# Patient Record
Sex: FEMALE | Race: WHITE | NOT HISPANIC OR LATINO
[De-identification: names, ages, dates, MRNs, and addresses within clinical notes are randomized per-mention and may not be internally consistent; named-entity substitution may affect disease eponyms.]

---

## 2021-04-21 ENCOUNTER — NON-APPOINTMENT (OUTPATIENT)
Age: 31
End: 2021-04-21

## 2021-06-03 ENCOUNTER — APPOINTMENT (OUTPATIENT)
Dept: HUMAN REPRODUCTION | Facility: CLINIC | Age: 31
End: 2021-06-03
Payer: COMMERCIAL

## 2021-06-03 ENCOUNTER — NON-APPOINTMENT (OUTPATIENT)
Age: 31
End: 2021-06-03

## 2021-06-03 PROBLEM — Z00.00 ENCOUNTER FOR PREVENTIVE HEALTH EXAMINATION: Status: ACTIVE | Noted: 2021-06-03

## 2021-06-03 PROCEDURE — 99072 ADDL SUPL MATRL&STAF TM PHE: CPT

## 2021-06-03 PROCEDURE — 99205 OFFICE O/P NEW HI 60 MIN: CPT | Mod: 25

## 2021-06-03 PROCEDURE — 76830 TRANSVAGINAL US NON-OB: CPT

## 2021-06-03 PROCEDURE — 36415 COLL VENOUS BLD VENIPUNCTURE: CPT

## 2025-06-17 ENCOUNTER — EMERGENCY (EMERGENCY)
Facility: HOSPITAL | Age: 35
LOS: 1 days | End: 2025-06-17
Attending: STUDENT IN AN ORGANIZED HEALTH CARE EDUCATION/TRAINING PROGRAM
Payer: COMMERCIAL

## 2025-06-17 VITALS
TEMPERATURE: 98 F | OXYGEN SATURATION: 97 % | HEART RATE: 68 BPM | RESPIRATION RATE: 16 BRPM | SYSTOLIC BLOOD PRESSURE: 134 MMHG | DIASTOLIC BLOOD PRESSURE: 80 MMHG

## 2025-06-17 VITALS
WEIGHT: 156.97 LBS | OXYGEN SATURATION: 100 % | TEMPERATURE: 98 F | SYSTOLIC BLOOD PRESSURE: 137 MMHG | HEART RATE: 96 BPM | RESPIRATION RATE: 18 BRPM | DIASTOLIC BLOOD PRESSURE: 88 MMHG

## 2025-06-17 LAB
ALBUMIN SERPL ELPH-MCNC: 4 G/DL — SIGNIFICANT CHANGE UP (ref 3.3–5)
ALP SERPL-CCNC: 42 U/L — SIGNIFICANT CHANGE UP (ref 40–120)
ALT FLD-CCNC: 14 U/L — SIGNIFICANT CHANGE UP (ref 10–45)
ANION GAP SERPL CALC-SCNC: 13 MMOL/L — SIGNIFICANT CHANGE UP (ref 5–17)
APPEARANCE UR: CLEAR — SIGNIFICANT CHANGE UP
APTT BLD: 25.9 SEC — LOW (ref 26.1–36.8)
AST SERPL-CCNC: 13 U/L — SIGNIFICANT CHANGE UP (ref 10–40)
BACTERIA # UR AUTO: ABNORMAL /HPF
BASOPHILS # BLD AUTO: 0.04 K/UL — SIGNIFICANT CHANGE UP (ref 0–0.2)
BASOPHILS NFR BLD AUTO: 0.4 % — SIGNIFICANT CHANGE UP (ref 0–2)
BILIRUB SERPL-MCNC: 0.4 MG/DL — SIGNIFICANT CHANGE UP (ref 0.2–1.2)
BILIRUB UR-MCNC: NEGATIVE — SIGNIFICANT CHANGE UP
BUN SERPL-MCNC: 12 MG/DL — SIGNIFICANT CHANGE UP (ref 7–23)
CALCIUM SERPL-MCNC: 9.4 MG/DL — SIGNIFICANT CHANGE UP (ref 8.4–10.5)
CAST: 0 /LPF — SIGNIFICANT CHANGE UP (ref 0–4)
CHLORIDE SERPL-SCNC: 105 MMOL/L — SIGNIFICANT CHANGE UP (ref 96–108)
CO2 SERPL-SCNC: 21 MMOL/L — LOW (ref 22–31)
COLOR SPEC: YELLOW — SIGNIFICANT CHANGE UP
CREAT SERPL-MCNC: 0.74 MG/DL — SIGNIFICANT CHANGE UP (ref 0.5–1.3)
DIFF PNL FLD: NEGATIVE — SIGNIFICANT CHANGE UP
EGFR: 108 ML/MIN/1.73M2 — SIGNIFICANT CHANGE UP
EGFR: 108 ML/MIN/1.73M2 — SIGNIFICANT CHANGE UP
EOSINOPHIL # BLD AUTO: 0.13 K/UL — SIGNIFICANT CHANGE UP (ref 0–0.5)
EOSINOPHIL NFR BLD AUTO: 1.2 % — SIGNIFICANT CHANGE UP (ref 0–6)
GLUCOSE SERPL-MCNC: 92 MG/DL — SIGNIFICANT CHANGE UP (ref 70–99)
GLUCOSE UR QL: NEGATIVE MG/DL — SIGNIFICANT CHANGE UP
HCG SERPL-ACNC: 3.3 MIU/ML — SIGNIFICANT CHANGE UP
HCT VFR BLD CALC: 39.1 % — SIGNIFICANT CHANGE UP (ref 34.5–45)
HGB BLD-MCNC: 12.7 G/DL — SIGNIFICANT CHANGE UP (ref 11.5–15.5)
IMM GRANULOCYTES # BLD AUTO: 0.03 K/UL — SIGNIFICANT CHANGE UP (ref 0–0.07)
IMM GRANULOCYTES NFR BLD AUTO: 0.3 % — SIGNIFICANT CHANGE UP (ref 0–0.9)
INR BLD: 0.95 RATIO — SIGNIFICANT CHANGE UP (ref 0.85–1.16)
KETONES UR QL: NEGATIVE MG/DL — SIGNIFICANT CHANGE UP
LEUKOCYTE ESTERASE UR-ACNC: ABNORMAL
LIDOCAIN IGE QN: 29 U/L — SIGNIFICANT CHANGE UP (ref 7–60)
LYMPHOCYTES # BLD AUTO: 3.1 K/UL — SIGNIFICANT CHANGE UP (ref 1–3.3)
LYMPHOCYTES NFR BLD AUTO: 27.6 % — SIGNIFICANT CHANGE UP (ref 13–44)
MAGNESIUM SERPL-MCNC: 1.9 MG/DL — SIGNIFICANT CHANGE UP (ref 1.6–2.6)
MCHC RBC-ENTMCNC: 29.1 PG — SIGNIFICANT CHANGE UP (ref 27–34)
MCHC RBC-ENTMCNC: 32.5 G/DL — SIGNIFICANT CHANGE UP (ref 32–36)
MCV RBC AUTO: 89.7 FL — SIGNIFICANT CHANGE UP (ref 80–100)
MONOCYTES # BLD AUTO: 0.58 K/UL — SIGNIFICANT CHANGE UP (ref 0–0.9)
MONOCYTES NFR BLD AUTO: 5.2 % — SIGNIFICANT CHANGE UP (ref 2–14)
NEUTROPHILS # BLD AUTO: 7.34 K/UL — SIGNIFICANT CHANGE UP (ref 1.8–7.4)
NEUTROPHILS NFR BLD AUTO: 65.3 % — SIGNIFICANT CHANGE UP (ref 43–77)
NITRITE UR-MCNC: NEGATIVE — SIGNIFICANT CHANGE UP
NRBC # BLD AUTO: 0 K/UL — SIGNIFICANT CHANGE UP (ref 0–0)
NRBC # FLD: 0 K/UL — SIGNIFICANT CHANGE UP (ref 0–0)
NRBC BLD AUTO-RTO: 0 /100 WBCS — SIGNIFICANT CHANGE UP (ref 0–0)
PH UR: 8 — SIGNIFICANT CHANGE UP (ref 5–8)
PLATELET # BLD AUTO: 297 K/UL — SIGNIFICANT CHANGE UP (ref 150–400)
PMV BLD: 10.8 FL — SIGNIFICANT CHANGE UP (ref 7–13)
POTASSIUM SERPL-MCNC: 3.8 MMOL/L — SIGNIFICANT CHANGE UP (ref 3.5–5.3)
POTASSIUM SERPL-SCNC: 3.8 MMOL/L — SIGNIFICANT CHANGE UP (ref 3.5–5.3)
PROT SERPL-MCNC: 7.1 G/DL — SIGNIFICANT CHANGE UP (ref 6–8.3)
PROT UR-MCNC: NEGATIVE MG/DL — SIGNIFICANT CHANGE UP
PROTHROM AB SERPL-ACNC: 10.9 SEC — SIGNIFICANT CHANGE UP (ref 9.9–13.4)
RBC # BLD: 4.36 M/UL — SIGNIFICANT CHANGE UP (ref 3.8–5.2)
RBC # FLD: 14.1 % — SIGNIFICANT CHANGE UP (ref 10.3–14.5)
RBC CASTS # UR COMP ASSIST: 1 /HPF — SIGNIFICANT CHANGE UP (ref 0–4)
SODIUM SERPL-SCNC: 139 MMOL/L — SIGNIFICANT CHANGE UP (ref 135–145)
SP GR SPEC: 1.01 — SIGNIFICANT CHANGE UP (ref 1–1.03)
SQUAMOUS # UR AUTO: 4 /HPF — SIGNIFICANT CHANGE UP (ref 0–5)
UROBILINOGEN FLD QL: 0.2 MG/DL — SIGNIFICANT CHANGE UP (ref 0.2–1)
WBC # BLD: 11.22 K/UL — HIGH (ref 3.8–10.5)
WBC # FLD AUTO: 11.22 K/UL — HIGH (ref 3.8–10.5)
WBC UR QL: 1 /HPF — SIGNIFICANT CHANGE UP (ref 0–5)

## 2025-06-17 PROCEDURE — 85730 THROMBOPLASTIN TIME PARTIAL: CPT

## 2025-06-17 PROCEDURE — 81001 URINALYSIS AUTO W/SCOPE: CPT

## 2025-06-17 PROCEDURE — 83690 ASSAY OF LIPASE: CPT

## 2025-06-17 PROCEDURE — 93975 VASCULAR STUDY: CPT

## 2025-06-17 PROCEDURE — 76830 TRANSVAGINAL US NON-OB: CPT | Mod: 26

## 2025-06-17 PROCEDURE — 99285 EMERGENCY DEPT VISIT HI MDM: CPT

## 2025-06-17 PROCEDURE — 96376 TX/PRO/DX INJ SAME DRUG ADON: CPT

## 2025-06-17 PROCEDURE — 84702 CHORIONIC GONADOTROPIN TEST: CPT

## 2025-06-17 PROCEDURE — 85610 PROTHROMBIN TIME: CPT

## 2025-06-17 PROCEDURE — 99285 EMERGENCY DEPT VISIT HI MDM: CPT | Mod: 25

## 2025-06-17 PROCEDURE — 83735 ASSAY OF MAGNESIUM: CPT

## 2025-06-17 PROCEDURE — 96374 THER/PROPH/DIAG INJ IV PUSH: CPT | Mod: XU

## 2025-06-17 PROCEDURE — 93975 VASCULAR STUDY: CPT | Mod: 26

## 2025-06-17 PROCEDURE — 80053 COMPREHEN METABOLIC PANEL: CPT

## 2025-06-17 PROCEDURE — 74177 CT ABD & PELVIS W/CONTRAST: CPT | Mod: 26

## 2025-06-17 PROCEDURE — 76830 TRANSVAGINAL US NON-OB: CPT

## 2025-06-17 PROCEDURE — 85025 COMPLETE CBC W/AUTO DIFF WBC: CPT

## 2025-06-17 PROCEDURE — 74177 CT ABD & PELVIS W/CONTRAST: CPT

## 2025-06-17 RX ADMIN — Medication 4 MILLIGRAM(S): at 15:55

## 2025-06-17 RX ADMIN — Medication 4 MILLIGRAM(S): at 12:42

## 2025-06-17 NOTE — ED ADULT NURSE NOTE - OBJECTIVE STATEMENT
34 y.o. female coming in for abdominal and pelvic pain. pt states that she had an egg retrieval on 6/14, states that she did not have complications with the procedure and had noted mild cramping and spotting after which she was told would be normal for her. today noted significant pain, nausea, and vaginal bleeding, called her OBGYN and was told to come into the ER for farther evaluation. pt denies PMH. A&Ox3, vss, pt denies CP/SOB/weakness/dizziness/fevers/chills/V/D/urinary symptoms, bed in lowest position, call bell in reach and teaching on use completed, verbalized understanding of call bell use.

## 2025-06-17 NOTE — ED PROVIDER NOTE - OBJECTIVE STATEMENT
34-year-old female with no significant past medical history status post egg retrieval on 6/14 presents to ED for pelvic pain.  Patient reports that procedure was reported to be unremarkable she was having some mild cramping and spotting which she was advised to be expected.  This morning had change in symptoms, reports severe shooting pelvic pain which has been persistent associated with mild nausea.  She spoke to her OB/GYN office and was advised to come to the ED.  She has not taken any for pain prior to arrival.  She denies fevers, heavy vaginal bleeding, dysuria chest pain, shortness of breath, vomiting.  OB: Nancie  Fertility: [facility]

## 2025-06-17 NOTE — CONSULT NOTE ADULT - SUBJECTIVE AND OBJECTIVE BOX
JOSE ALBERTO GREENBERG  34y  Female [id number]    HPI:  35yo G0 (LMP: 25) POD#3 from egg retrieval @ [facility] () p/w abdominal pain. Patient has had constant lower middle abdominal pain especial when walking since egg retrieval, pain worsened last night prompting patient to come to the ED. Reports 10/10 pain when first coming to ED now 7/10 after morphine. Denies vaginal bleeding, lightheadedness, dizziness, fevers, chills, SOB, CP, changes in urinary or bowel habits.       Name of GYN Physician: Dr. De Leon     POB:  Denies  Pgyn: Denies fibroids, cysts, STI's, Abnormal pap smears   PMH: Asthma   PSH: Egg retrieval (), LSC cholecystectomy, gastric sleeve   Home meds: None   All: NKDA       Vital Signs Last 24 Hrs  T(C): 36.9 (2025 15:50), Max: 36.9 (2025 15:50)  T(F): 98.5 (2025 15:50), Max: 98.5 (2025 15:50)  HR: 53 (2025 15:50) (53 - 98)  BP: 130/62 (2025 15:50) (116/71 - 137/88)  BP(mean): --  RR: 16 (2025 15:50) (15 - 18)  SpO2: 100% (2025 15:50) (97% - 100%)    Parameters below as of 2025 15:50  Patient On (Oxygen Delivery Method): room air        Physical Exam:   General: sitting comfortably in bed, NAD   Lungs: Breathing comfortably on room air   Abd: Soft, non-distended, tender on palpation over suprapubic area, no unilateral tenderness. No rebound or guarding.  :  No bleeding on pad. External labia wnl.  Bimanual exam with no cervical motion tenderness, tenderness over suprapubic area, adnexa non palpable b/l.    Ext: non-tender b/l, no edema     LABS:                            12.7   11.22 )-----------( 297      ( 2025 12:33 )             39.1     06-17    139  |  105  |  12  ----------------------------<  92  3.8   |  21[L]  |  0.74    Ca    9.4      2025 12:33  Mg     1.9         TPro  7.1  /  Alb  4.0  /  TBili  0.4  /  DBili  x   /  AST  13  /  ALT  14  /  AlkPhos  42      I&O's Detail    PT/INR - ( 2025 12:33 )   PT: 10.9 sec;   INR: 0.95 ratio         PTT - ( 2025 12:33 )  PTT:25.9 sec  Urinalysis Basic - ( 2025 14:43 )    Color: Yellow / Appearance: Clear / S.012 / pH: x  Gluc: x / Ketone: x  / Bili: Negative / Urobili: 0.2 mg/dL   Blood: x / Protein: Negative mg/dL / Nitrite: Negative   Leuk Esterase: Trace / RBC: 1 /HPF / WBC 1 /HPF   Sq Epi: x / Non Sq Epi: 4 /HPF / Bacteria: Few /HPF        RADIOLOGY & ADDITIONAL STUDIES:  TVUS:     ACC: [id number] EXAM:  US DPLX PELVIC   ORDERED BY: CARLOTA ACOSTA     ACC: [id number] EXAM:  US TRANSVAGINAL   ORDERED BY: CARLOTA ACOSTA     PROCEDURE DATE:  2025          INTERPRETATION:  CLINICAL INFORMATION: Pelvic pain status post egg   retrieval.    LMP: 2025.    COMPARISON: None available.    TECHNIQUE:  Endovaginal and transabdominal pelvic sonogram. Color and Spectral   Doppler was performed.    FINDINGS:  Uterus: 6.9 cm x 3.1 cm x 4.4 cm. Multiple small fibroids, the largest of   which measures 2.5 x 2.7 x 2.7 cm.  Endometrium: 6 mm. Within normal limits.    Right ovary: 4.8 cm x 3.8 cm x 5.0 cm. Multiple cysts. Normal arterial   and venous waveforms.  Left ovary: 8.0 cm x 5.1 cm x 6.3 cm. Multiple cysts. 2.3 x2.1 x 2.2 cm   hemorrhagic cyst. Normal arterial and venous waveforms.    Fluid: Small amount of free fluid within the cul-de-sac and bilateral   adnexa.    IMPRESSION:    Enlarged ovaries with multiple cysts as can be seen with IVF.    Small amount of pelvic fluid.        --- End of Report ---            KEYANA PAIGE MD; Attending Radiologist  This document has been electronically signed. 2025  3:35PM   JOSE ALBERTO GREENBERG  34y  Female [id number]    HPI:  35yo G0 (LMP: 25) POD#3 from egg retrieval @ [facility] () p/w abdominal pain. Patient has had constant lower middle abdominal pain especial when walking since egg retrieval, pain worsened last night prompting patient to come to the ED. Reports 10/10 pain when first coming to ED now 7/10 after morphine. Denies vaginal bleeding, lightheadedness, dizziness, fevers, chills, SOB, CP, changes in urinary or bowel habits.       Name of GYN Physician: Dr. Canada    POB:  Denies  Pgyn: Denies fibroids, cysts, STI's, Abnormal pap smears   PMH: Asthma   PSH: Egg retrieval (), LSC cholecystectomy, gastric sleeve   Home meds: None   All: NKDA       Vital Signs Last 24 Hrs  T(C): 36.9 (2025 15:50), Max: 36.9 (2025 15:50)  T(F): 98.5 (2025 15:50), Max: 98.5 (2025 15:50)  HR: 53 (2025 15:50) (53 - 98)  BP: 130/62 (2025 15:50) (116/71 - 137/88)  BP(mean): --  RR: 16 (2025 15:50) (15 - 18)  SpO2: 100% (2025 15:50) (97% - 100%)    Parameters below as of 2025 15:50  Patient On (Oxygen Delivery Method): room air        Physical Exam:   General: sitting comfortably in bed, NAD   Lungs: Breathing comfortably on room air   Abd: Soft, non-distended, tender on palpation over suprapubic area, no unilateral tenderness. No rebound or guarding.  :  No bleeding on pad. External labia wnl.  Bimanual exam with no cervical motion tenderness, tenderness over suprapubic area, adnexa non palpable b/l.    Ext: non-tender b/l, no edema     LABS:                            12.7   11.22 )-----------( 297      ( 2025 12:33 )             39.1     06-17    139  |  105  |  12  ----------------------------<  92  3.8   |  21[L]  |  0.74    Ca    9.4      2025 12:33  Mg     1.9         TPro  7.1  /  Alb  4.0  /  TBili  0.4  /  DBili  x   /  AST  13  /  ALT  14  /  AlkPhos  42      I&O's Detail    PT/INR - ( 2025 12:33 )   PT: 10.9 sec;   INR: 0.95 ratio         PTT - ( 2025 12:33 )  PTT:25.9 sec  Urinalysis Basic - ( 2025 14:43 )    Color: Yellow / Appearance: Clear / S.012 / pH: x  Gluc: x / Ketone: x  / Bili: Negative / Urobili: 0.2 mg/dL   Blood: x / Protein: Negative mg/dL / Nitrite: Negative   Leuk Esterase: Trace / RBC: 1 /HPF / WBC 1 /HPF   Sq Epi: x / Non Sq Epi: 4 /HPF / Bacteria: Few /HPF        RADIOLOGY & ADDITIONAL STUDIES:  TVUS:     ACC: [id number] EXAM:  US DPLX PELVIC   ORDERED BY: CARLOTA ACOSTA     ACC: [id number] EXAM:  US TRANSVAGINAL   ORDERED BY: CARLOTA ACOSTA     PROCEDURE DATE:  2025          INTERPRETATION:  CLINICAL INFORMATION: Pelvic pain status post egg   retrieval.    LMP: 2025.    COMPARISON: None available.    TECHNIQUE:  Endovaginal and transabdominal pelvic sonogram. Color and Spectral   Doppler was performed.    FINDINGS:  Uterus: 6.9 cm x 3.1 cm x 4.4 cm. Multiple small fibroids, the largest of   which measures 2.5 x 2.7 x 2.7 cm.  Endometrium: 6 mm. Within normal limits.    Right ovary: 4.8 cm x 3.8 cm x 5.0 cm. Multiple cysts. Normal arterial   and venous waveforms.  Left ovary: 8.0 cm x 5.1 cm x 6.3 cm. Multiple cysts. 2.3 x2.1 x 2.2 cm   hemorrhagic cyst. Normal arterial and venous waveforms.    Fluid: Small amount of free fluid within the cul-de-sac and bilateral   adnexa.    IMPRESSION:    Enlarged ovaries with multiple cysts as can be seen with IVF.    Small amount of pelvic fluid.        --- End of Report ---            KEYANA PAIGE MD; Attending Radiologist  This document has been electronically signed. 2025  3:35PM

## 2025-06-17 NOTE — ED PROVIDER NOTE - CLINICAL SUMMARY MEDICAL DECISION MAKING FREE TEXT BOX
34-year-old female with no significant past medical history status post egg retrieval on 6/14 presents to ED for pelvic pain.  Patient reports that procedure was reported to be unremarkable she was having some mild cramping and spotting which she was advised to be expected.  This morning had change in symptoms, reports severe shooting pelvic pain which has been persistent associated with mild nausea.  She spoke to her OB/GYN office and was advised to come to the ED.  She has not taken any for pain prior to arrival.  She denies fevers, heavy vaginal bleeding, dysuria chest pain, shortness of breath, vomiting.  OB: Nancie  Fertility: [facility]    CT and ultrasound of pelvis, showing only small free fluid, low concern for torsion, or other ovarian emergency.  GYN consulted most likely postprocedural pain.  Gave pain control with morphine and Tylenol in ED.  Confirmed with GYN that NSAID use at home is okay.  Will discharge with instructions for around-the-clock Tylenol and NSAID use.  Patient has follow-up with her GYN tomorrow morning.

## 2025-06-17 NOTE — ED ADULT TRIAGE NOTE - CHIEF COMPLAINT QUOTE
egg retrieval saturday, intense suprapubic pain  Denies heavy vaginal bleeding, chest pain, SOB, n/v

## 2025-06-17 NOTE — ED PROVIDER NOTE - PROGRESS NOTE DETAILS
Patient seen by GYN who will discuss with attending, requesting CT of the abdomen pelvis given ultrasound without obvious findings to explain patient's pain.  Ultrasound with enlarged ovaries with multiple cysts likely related to recent procedure.  Additionally, small amount of pelvic fluid visualized as well  Maria C Villaseñor PA-C CT resulted with large multi focal follicular ovaries with small pelvic ascites, no other acute findings.  Seen and evaluated by OB/GYN team who cleared patient from their perspective recommending Tylenol oxycodone.  Advised patient should follow-up with [facility] this week if discharged home.  Discussed with patient reports overall pain is improved but not resolved.  Patient would like to be monitored for longer prior to making decision whether she feels comfortable with discharge home.  Alternatively advised patient can potentially monitor in CDU for continued pain control and transition to oral medications.  If patient stays, will contact GYN to serially follow.  Maria C Villaseñor PA-C

## 2025-06-17 NOTE — CONSULT NOTE ADULT - ASSESSMENT
A/P: 35yo G0 (LMP: 5/31/25) POD#3 from egg retrieval @ [facility] (6/14) p/w abdominal pain. Patient has had constant lower middle abdominal pain especial when walking since egg retrieval, pain worsened last night prompting patient to come to the ED. Reports 10/10 pain when first coming to ED now 7/10 after morphine. VSS. H/H wnl. Electrolytes wnl. Physical exam with tenderness over suprapubic area, no unilateral tenderness, no rebound or guarding. TVUS showed enlarged bilateral ovaries with multiple cysts and small amount of pelvic fluid. Blood flow demonstrated on US. Differential for pain includes pain from large space occupying ovaries vs. pelvic fluid from egg retrieval (possible given small amount of free fluid seen on TVUS) vs. ovarian torsion. Low concern for torsion at this time given benign abdomen and clinical picture with overall well apperaing, well mentating patient.    - Pain control with Tylenol and Oxy  - No NSAIDs    - f/u CTAP     d/w Dr. De Leon,   Karie Nicholas, PGY1  Kelly Dutta, PGY2    A/P: 33yo G0 (LMP: 5/31/25) POD#3 from egg retrieval @ [facility] (6/14) p/w abdominal pain. Patient has had constant lower middle abdominal pain especial when walking since egg retrieval, pain worsened last night prompting patient to come to the ED. Reports 10/10 pain when first coming to ED now 7/10 after morphine. VSS. H/H wnl. Electrolytes wnl. Physical exam with tenderness over suprapubic area, no unilateral tenderness, no rebound or guarding. TVUS showed enlarged bilateral ovaries with multiple cysts and small amount of pelvic fluid. Blood flow demonstrated on US. Differential for pain includes pain from large space occupying ovaries vs. pelvic fluid from egg retrieval (possible given small amount of free fluid seen on TVUS) vs. ovarian torsion. Low concern for torsion at this time given benign abdomen and clinical picture with overall well appearing well mentating patient.      - No acute GYN intervention at this time  - Pain control with Tylenol and Oxy  - No NSAIDs    - f/u CTAP   - Pt to follow up with [facility]     d/w Dr. De Leon,   Karie Nicholas, PGY1  Kelly Dutta, PGY2

## 2025-06-17 NOTE — ED ADULT NURSE REASSESSMENT NOTE - NS ED NURSE REASSESS COMMENT FT1
pt ambulated to the bathroom with steady gait and no need for mechanical assistance, pt states that she is having decreased pain after IV pain medications, pending dispo

## 2025-06-17 NOTE — ED PROVIDER NOTE - NSFOLLOWUPINSTRUCTIONS_ED_ALL_ED_FT
You were seen in the ED for severe abdominal pain.  You were given pain control medicines, had an ultrasound of the pelvis, and CAT scan of the abdomen and pelvis, which showed only trace fluid around the pelvis, which is to be expected after your procedure.  You were seen by gynecology in the ED, who recommended discharge with pain control.  Please take Tylenol and ibuprofen standing as directed by the instructions on the bottle.  Please follow-up with your gynecologist as soon as you can.  Please return to the hospital if you have severe intractable abdominal pain, nausea vomiting, diarrhea, chest pain, difficulty breathing, loss of consciousness, fever, excessive sweating, or other severe symptoms.

## 2025-06-17 NOTE — ED PROVIDER NOTE - PATIENT PORTAL LINK FT
You can access the FollowMyHealth Patient Portal offered by [facility] by registering at the following website: [email or web address]. By joining [facility]’s FollowMyHealth portal, you will also be able to view your health information using other applications (apps) compatible with our system.

## 2025-06-17 NOTE — ED PROVIDER NOTE - ATTENDING APP SHARED VISIT CONTRIBUTION OF CARE
Attending Michelle Kiran MD- This was a shared visit with [facility].  I have reviewed and discussed the case with the [facility] and agree with verified documentation unless otherwise documented.  I have independently spoken with and examined the patient and my documentation of history/physical exam and MDM are as follows:    34F with no PMH presenting for evaluation of acute pelvic pain s/p egg retrieval on 6/14.  Patient states she is undergoing first cycle of IVF.  Endorsing nausea associated with the pain; no fever, chest pain, shortness of breath, cough, vomiting, diarrhea, urinary symptoms, vaginal bleeding or discharge.     On exam patient in moderate distress due to pain, normal work of breathing, speaking full sentences.  Abdomen soft with very mild pelvic distention, diffuse tenderness to palpation across low quadrants without rebound or guarding, no CVA tenderness.     Vital signs stable, concern for torsion, ovarian hyperstimulation syndrome, other acute GYN pathology.  Pain meds, labs, TVUS, anticipate gynecology consult, CT if ultrasound nonactionable.     Mild leukocytosis to 11.2 likely reactive in setting of pain.  No anemia or thrombocytopenia.  No significant electrolyte abnormality or evidence of JARED.  Paddock panel within normal limits.  hCG 3.3, not consistent with pregnancy, attributable to IVF.  Urinalysis inconsistent with infection. TVUS noting enlarged bilateral ovaries multiple cysts, small pelvic free fluid, no evidence of torsion. CT nonactionable. OBGYN evaluated patient, cleared for outpatient f/u. Given requirement for opiate analgesia, offered admission for pain control and serial abdominal exams- patient expressing preference for discharge. S/o to Dr. Jacobsen pending reassessment 4 hour after opiate meds, final dispo

## 2025-06-17 NOTE — ED PROVIDER NOTE - PHYSICAL EXAMINATION
CONSTITUTIONAL: Patient is awake, alert and oriented x 3. Patient is uncomfortable appearing, tearful  HEAD: NCAT  NECK: supple, FROM  LUNGS: CTA b/l, no wheezing or rales   HEART: RRR.+S1S2 no murmurs  ABDOMEN: Soft, non-distended, nttp, no rebound or guarding  EXTREMITY: No edema or calf tenderness b/l, FROM upper and lower ext b/l  SKIN: No rash or lesions  NEURO: No focal deficits